# Patient Record
Sex: FEMALE | Race: OTHER | Employment: OTHER | URBAN - METROPOLITAN AREA
[De-identification: names, ages, dates, MRNs, and addresses within clinical notes are randomized per-mention and may not be internally consistent; named-entity substitution may affect disease eponyms.]

---

## 2020-08-04 NOTE — PATIENT DISCUSSION
GLAUCOMA SUSPECT, OU BASED ON OPTIC NERVE CUPPING. IOP 12/11, OU. OCT TODAY TO CHECK FOR RNFL THINNING SHOWED NONSPECIFIC CHANGES CONSISTENT WITH THIS DIAGNOSIS. SCHEDULE VISUAL FIELD AT REFERRING O.D'S OFFICE TO CHECK FOR VISUAL FIELD LOSS PRIOR TO PHACO. CONSIDER KDB.

## 2020-08-04 NOTE — PATIENT DISCUSSION
New Prescription: prednisolone acetate (prednisolone acetate): drops,suspension: 1% 1 drop four times a day as directed 08-

## 2020-08-04 NOTE — PATIENT DISCUSSION
REFRACTIVE ERROR, OU - DISCUSSED OPTION OF CORRECTING AT THE TIME OF CATARACT SURGERY. PT UNDERSTANDS AND ELECTS TO CONSIDER THEIR OPTIONS. SHE IS INTERESTED IN REFRACTIVE CATARACT SURGERY WITH A VIVITY IOL. SHE UNDERSTANDS GLASSES WILL BE NEEDED FOR ALL NEAR AND INTERMEDIATE ACTIVITIES IF SHE CHOOSES TO PROCEED WITH A MONOFOCAL IOL SET FOR DISTANCE OU.

## 2020-08-04 NOTE — PATIENT DISCUSSION
Surgery Counseling: I have discussed the option of glasses versus cataract surgery versus following. It was explained that when the patients vision no longer meets their visual needs and a glasses prescription does not improve visual symptoms, the option of cataract surgery is a reasonable next step. It was explained that there is no guarantee that removing the cataract will improve their visual symptoms, however; it is believed that the cataract is contributing to the patient's visual impairment and surgery may improve both the visual and functional status of the patient. The risks, benefits and alternatives of surgery were discussed with the patient. After this discussion, the patient desires to proceed with cataract surgery with implantation of an intraocular lens to improve vision for sew and watch tv.

## 2020-08-04 NOTE — PATIENT DISCUSSION
Discussed the option of a Goniotomy/KDB, [facility]. A KDB is a micro-invasive glaucoma surgery performed by removing a section of trabecular tissue giving aqueous direct access to the  channels and the distal outflow system. The goal of the procedure is to lower pressure and prevent further damage to the optic nerve. Patient understands and will decide on KDB.

## 2020-08-04 NOTE — PATIENT DISCUSSION
***The patient is interested in refractive cataract surgery. After discussing all options for becoming less dependent on glasses after surgery, the patient has is considering intermediate and distance vision with the Livia Du. Discussed with patient the benefit of the Livia Du as a mulitfocal IOL without the compromises of the rings and halos in the IOL design of other multifocals. The anticipated visual outcome is satisfactory distance and intermediate with some near (primarily for digital material- the patient may require glasses for some small detail and/or fine print). ***

## 2020-08-20 NOTE — PATIENT DISCUSSION
Surgery  Counseling: I have discussed the option of glasses versus cataract surgery versus following . It was explained that when vision no longer meets the patient's visual needs and a new prescription for glasses is not likely to improve all of the patient's visual symptoms, the option of cataract surgery is a reasonable next step. It was explained that there is no guarantee that removing the cataract will improve their visual symptoms, however; it is believed that the cataract is contributing to the patient's visual impairment and surgery may significantly improve both the visual and functional status of the patient. The risks, benefits and alternatives of surgery were discussed with the patient. After this discussion, the patient desires to proceed with cataract surgery with implantation of an intraocular lens to improve vision for glare when driving at night.

## 2020-08-20 NOTE — PATIENT DISCUSSION
Continue: prednisolone acetate (prednisolone acetate): drops,suspension: 1% 1 drop four times a day as directed 08-

## 2020-08-26 NOTE — PATIENT DISCUSSION
SAME DAY S/P PC IOL OS,  - CONTINUE DROPS AS DIRECTED. PT RELEASED TO  PROVIDER FOR CONTINUED CARE. RTC PRN.

## 2022-08-19 ENCOUNTER — PREPPED CHART (OUTPATIENT)
Dept: URBAN - METROPOLITAN AREA CLINIC 10 | Facility: CLINIC | Age: 27
End: 2022-08-19

## 2024-03-22 ENCOUNTER — ESTABLISHED PATIENT (OUTPATIENT)
Facility: LOCATION | Age: 29
End: 2024-03-22

## 2024-03-22 DIAGNOSIS — H52.13: ICD-10-CM

## 2024-03-22 PROCEDURE — 92014 COMPRE OPH EXAM EST PT 1/>: CPT

## 2024-03-22 PROCEDURE — 92015 DETERMINE REFRACTIVE STATE: CPT

## 2024-03-22 ASSESSMENT — KERATOMETRY
OD_AXISANGLE2_DEGREES: 80
OS_AXISANGLE_DEGREES: 4
OD_AXISANGLE_DEGREES: 170
OS_K2POWER_DIOPTERS: 46.25
OD_K2POWER_DIOPTERS: 45.25
OS_K1POWER_DIOPTERS: 44.00
OS_AXISANGLE2_DEGREES: 94
OD_K1POWER_DIOPTERS: 44.00

## 2024-03-22 ASSESSMENT — TONOMETRY
OS_IOP_MMHG: 17
OD_IOP_MMHG: 15

## 2024-03-22 ASSESSMENT — VISUAL ACUITY
OD_SC: 20/70
OU_SC: 20/40
OS_SC: 20/40-1

## 2025-03-24 ENCOUNTER — COMPREHENSIVE EXAM (OUTPATIENT)
Age: 30
End: 2025-03-24

## 2025-03-24 DIAGNOSIS — H52.13: ICD-10-CM

## 2025-03-24 PROCEDURE — 92015 DETERMINE REFRACTIVE STATE: CPT

## 2025-03-24 PROCEDURE — 92014 COMPRE OPH EXAM EST PT 1/>: CPT
